# Patient Record
Sex: MALE | Race: WHITE | NOT HISPANIC OR LATINO | Employment: FULL TIME | URBAN - METROPOLITAN AREA
[De-identification: names, ages, dates, MRNs, and addresses within clinical notes are randomized per-mention and may not be internally consistent; named-entity substitution may affect disease eponyms.]

---

## 2017-11-09 ENCOUNTER — ALLSCRIPTS OFFICE VISIT (OUTPATIENT)
Dept: OTHER | Facility: OTHER | Age: 42
End: 2017-11-09

## 2018-01-14 VITALS
DIASTOLIC BLOOD PRESSURE: 70 MMHG | RESPIRATION RATE: 16 BRPM | SYSTOLIC BLOOD PRESSURE: 110 MMHG | TEMPERATURE: 100.1 F | HEIGHT: 70 IN | WEIGHT: 187 LBS | HEART RATE: 72 BPM | BODY MASS INDEX: 26.77 KG/M2

## 2018-01-18 NOTE — PROGRESS NOTES
Assessment    1  Groin discomfort (359 09) (R10 30)    Plan  Groin discomfort    · 2 Alberto Espana MD, [place]  (General Surgery) Physician Referral  Consult  Status: Hold For -  Scheduling  Requested for: [id number]  Care Summary provided  : Yes    Discussion/Summary  Discussion Summary:   Cannot r/o inguinal hernia   rto prn  Medication SE Review and Pt Understands Tx: The treatment plan was reviewed with the patient/guardian  The patient/guardian understands and agrees with the treatment plan      Chief Complaint  Chief Complaint Free Text Note Form: Patient is here for c/o sharp groin pain that began three weeks ago  nil/lpn      History of Present Illness  HPI: 45 y o m seen for acute L groin pain started 3 wks ago, has constant mild discomfort, worsening to severe pain with certain movement, normal BM, no PMH, no trauma or injury, tried Tylenol/Motrin with no help      Review of Systems  Complete-Male:   Constitutional: No fever or chills, feels well, no tiredness, no recent weight gain or weight loss  Gastrointestinal: No complaints of abdominal pain, no constipation, no nausea or vomiting, no diarrhea or bloody stools  Genitourinary: No complaints of dysuria, no incontinence, no hesitancy, no nocturia, no genital lesion, no testicular pain  Musculoskeletal: No complaints of arthralgia, no myalgias, no joint swelling or stiffness, no limb pain or swelling  Integumentary: No complaints of skin rash or skin lesions, no itching, no skin wound, no dry skin  Neurological: No compliants of headache, no confusion, no convulsions, no numbness or tingling, no dizziness or fainting, no limb weakness, no difficulty walking  Past Medical History    1  No pertinent past medical history  Active Problems And Past Medical History Reviewed: The active problems and past medical history were reviewed and updated today  Surgical History    1  History of Uvulectomy  Surgical History Reviewed:    The surgical history was reviewed and updated today  Family History    1  Family history of schizophrenia (V17 0) (Z81 8)  Family History Reviewed: The family history was reviewed and updated today  Social History    · Alcohol ingestion of more than four drinks per week (V69 8) (Z72 89)   · Caffeine use (V49 89) (F15 90)   · Former smoker (W37 45) (I86 156)  Social History Reviewed: The social history was reviewed and updated today  Current Meds   1  No Reported Medications Recorded    Allergies    1  Penicillins    Vitals  Vital Signs [Data Includes: Current Encounter]    Recorded: [id number] 11:35AM   Temperature 97 8 F   Heart Rate 76, L Radial   Pulse Quality Normal, L Radial   Respiration 18   Respiration Quality Normal   Systolic 099, LUE, Sitting   Diastolic 72, LUE, Sitting   Height 5 ft 9 5 in   Weight 180 lb 6 oz   BMI Calculated 26 25   BSA Calculated 1 99     Physical Exam    Constitutional   General appearance: No acute distress, well appearing and well nourished  Cardiovascular   Auscultation of heart: Normal rate and rhythm, normal S1 and S2, without murmurs  Abdomen   Abdomen: Non-tender, no masses  localized tenderness in L groin, no bulge felt  Musculoskeletal   Inspection/palpation of joints, bones, and muscles: Normal     Skin   Skin and subcutaneous tissue: Normal without rashes or lesions           Signatures   Electronically signed by : ANDREW Renteria ; Feb 2 2016  1:17PM EST                       (Author)

## 2021-03-31 DIAGNOSIS — Z23 ENCOUNTER FOR IMMUNIZATION: ICD-10-CM

## 2021-04-06 ENCOUNTER — IMMUNIZATIONS (OUTPATIENT)
Dept: FAMILY MEDICINE CLINIC | Facility: HOSPITAL | Age: 46
End: 2021-04-06

## 2021-04-06 DIAGNOSIS — Z23 ENCOUNTER FOR IMMUNIZATION: Primary | ICD-10-CM

## 2021-04-06 PROCEDURE — 91300 SARS-COV-2 / COVID-19 MRNA VACCINE (PFIZER-BIONTECH) 30 MCG: CPT

## 2021-04-06 PROCEDURE — 0001A SARS-COV-2 / COVID-19 MRNA VACCINE (PFIZER-BIONTECH) 30 MCG: CPT

## 2021-04-27 ENCOUNTER — IMMUNIZATIONS (OUTPATIENT)
Dept: FAMILY MEDICINE CLINIC | Facility: HOSPITAL | Age: 46
End: 2021-04-27

## 2021-04-27 DIAGNOSIS — Z23 ENCOUNTER FOR IMMUNIZATION: Primary | ICD-10-CM

## 2021-04-27 PROCEDURE — 0002A SARS-COV-2 / COVID-19 MRNA VACCINE (PFIZER-BIONTECH) 30 MCG: CPT

## 2021-04-27 PROCEDURE — 91300 SARS-COV-2 / COVID-19 MRNA VACCINE (PFIZER-BIONTECH) 30 MCG: CPT

## 2024-07-22 ENCOUNTER — APPOINTMENT (OUTPATIENT)
Dept: RADIOLOGY | Facility: CLINIC | Age: 49
End: 2024-07-22
Payer: COMMERCIAL

## 2024-07-22 ENCOUNTER — OFFICE VISIT (OUTPATIENT)
Dept: URGENT CARE | Facility: CLINIC | Age: 49
End: 2024-07-22

## 2024-07-22 VITALS
OXYGEN SATURATION: 100 % | HEIGHT: 69 IN | HEART RATE: 60 BPM | RESPIRATION RATE: 18 BRPM | WEIGHT: 175 LBS | SYSTOLIC BLOOD PRESSURE: 126 MMHG | TEMPERATURE: 97.9 F | DIASTOLIC BLOOD PRESSURE: 63 MMHG | BODY MASS INDEX: 25.92 KG/M2

## 2024-07-22 DIAGNOSIS — M54.42 ACUTE MIDLINE LOW BACK PAIN WITH LEFT-SIDED SCIATICA: Primary | ICD-10-CM

## 2024-07-22 DIAGNOSIS — M54.42 ACUTE MIDLINE LOW BACK PAIN WITH LEFT-SIDED SCIATICA: ICD-10-CM

## 2024-07-22 PROCEDURE — 72100 X-RAY EXAM L-S SPINE 2/3 VWS: CPT

## 2024-07-22 RX ORDER — PREDNISONE 10 MG/1
40 TABLET ORAL DAILY
Qty: 16 TABLET | Refills: 0 | Status: SHIPPED | OUTPATIENT
Start: 2024-07-22 | End: 2024-07-26

## 2024-07-22 RX ORDER — KETOROLAC TROMETHAMINE 30 MG/ML
15 INJECTION, SOLUTION INTRAMUSCULAR; INTRAVENOUS ONCE
Status: COMPLETED | OUTPATIENT
Start: 2024-07-22 | End: 2024-07-22

## 2024-07-22 RX ORDER — CYCLOBENZAPRINE HCL 10 MG
10 TABLET ORAL 3 TIMES DAILY PRN
Qty: 12 TABLET | Refills: 0 | Status: SHIPPED | OUTPATIENT
Start: 2024-07-22

## 2024-07-22 RX ADMIN — KETOROLAC TROMETHAMINE 15 MG: 30 INJECTION, SOLUTION INTRAMUSCULAR; INTRAVENOUS at 13:04

## 2024-07-22 NOTE — PROGRESS NOTES
Assessment/Plan    Acute midline low back pain with left-sided sciatica [M54.42]  1. Acute midline low back pain with left-sided sciatica  ketorolac (TORADOL) injection 15 mg    XR spine lumbar 2 or 3 views injury    cyclobenzaprine (FLEXERIL) 10 mg tablet    predniSONE 10 mg tablet    Ambulatory referral to Orthopedic Surgery        Reviewed lumbar x-rays, no fractures appreciated upon review. Pending official radiology review, will call patient if any abnormality is noted. Provided patient with referral to Orthopedic Surgery. Prescribed Flexeril and prednisone to mitigate inflammation and improve pain. Provided patient with Toradol injection at the office.    Subjective:     Patient ID: Arley Caceres is a 49 y.o. male.      Reason For Visit / Chief Complaint  Chief Complaint   Patient presents with    Back Pain     Lower back pain that radiates down left leg. Happened Thursday when he was picking up a chair. Felt a pop         Arley is a 49 year old male with no past medical history who presents with lower back pain that radiates into the left leg for the past 4 days. The patient reports that on Thursday, 7/18, he was bending over to  a folding chair when he immediately felt a pop and sharp pain in his lower back. He reports the pain radiates into his left hip, inner thigh, and lateral side of his lower leg. He reports he has tried Advil, Tylenol, heat, ice, and a topical cream, but the pain has not subsided. The patient reports the pain is limiting his ability to walk. He denies any fever, chills, abdominal pain, nausea, vomiting, chest pain, or shortness of breath. Denies saddle anesthesia or urinary incontinence.       History reviewed. No pertinent past medical history.    History reviewed. No pertinent surgical history.    History reviewed. No pertinent family history.    Review of Systems   Constitutional:  Negative for chills and fever.   Respiratory:  Negative for chest tightness and shortness of  "breath.    Cardiovascular:  Negative for chest pain.   Gastrointestinal:  Negative for diarrhea, nausea and vomiting.   Musculoskeletal:  Positive for back pain (lower back). Negative for neck pain.   Neurological:  Negative for dizziness, tremors and numbness.       Objective:    /63 (BP Location: Left arm, Patient Position: Sitting, Cuff Size: Standard)   Pulse 60   Temp 97.9 °F (36.6 °C) (Temporal)   Resp 18   Ht 5' 9\" (1.753 m)   Wt 79.4 kg (175 lb)   SpO2 100%   BMI 25.84 kg/m²     Physical Exam  Vitals reviewed.   Constitutional:       General: He is not in acute distress.     Appearance: Normal appearance. He is normal weight. He is not ill-appearing, toxic-appearing or diaphoretic.   HENT:      Head: Normocephalic and atraumatic.      Nose: Nose normal.      Mouth/Throat:      Mouth: Mucous membranes are moist.      Pharynx: Oropharynx is clear.   Eyes:      Extraocular Movements: Extraocular movements intact.      Conjunctiva/sclera: Conjunctivae normal.   Neck:      Vascular: No carotid bruit.   Cardiovascular:      Rate and Rhythm: Normal rate and regular rhythm.      Heart sounds: Normal heart sounds. No murmur heard.     No friction rub. No gallop.   Pulmonary:      Effort: Pulmonary effort is normal. No respiratory distress.      Breath sounds: Normal breath sounds. No stridor. No wheezing, rhonchi or rales.   Chest:      Chest wall: No tenderness.   Musculoskeletal:         General: Tenderness (lumbar back and to the left of his lumbar spine) present. No swelling. Normal range of motion.      Cervical back: Normal range of motion and neck supple. No rigidity or tenderness.      Lumbar back: Tenderness (lower back with radiation into left hip and buttocks) present.   Lymphadenopathy:      Cervical: No cervical adenopathy.   Skin:     General: Skin is warm and dry.   Neurological:      Mental Status: He is alert.               "

## 2024-07-22 NOTE — LETTER
July 22, 2024     Patient: Arley Caceres  YOB: 1975  Date of Visit: 7/22/2024      To Whom it May Concern:    Arley Caceres is under my professional care. Arley was seen in my office on 7/22/2024. Arley may return to work on 7/25/24 .    If you have any questions or concerns, please don't hesitate to call.         Sincerely,          Shital Alanis PA-C        CC: No Recipients